# Patient Record
Sex: MALE | Race: WHITE | NOT HISPANIC OR LATINO
[De-identification: names, ages, dates, MRNs, and addresses within clinical notes are randomized per-mention and may not be internally consistent; named-entity substitution may affect disease eponyms.]

---

## 2018-12-05 PROBLEM — Z00.00 ENCOUNTER FOR PREVENTIVE HEALTH EXAMINATION: Status: ACTIVE | Noted: 2018-12-05

## 2018-12-13 ENCOUNTER — APPOINTMENT (OUTPATIENT)
Dept: OTOLARYNGOLOGY | Facility: CLINIC | Age: 58
End: 2018-12-13
Payer: COMMERCIAL

## 2018-12-13 VITALS
HEIGHT: 73 IN | WEIGHT: 237 LBS | BODY MASS INDEX: 31.41 KG/M2 | DIASTOLIC BLOOD PRESSURE: 78 MMHG | SYSTOLIC BLOOD PRESSURE: 118 MMHG

## 2018-12-13 DIAGNOSIS — H61.20 IMPACTED CERUMEN, UNSPECIFIED EAR: ICD-10-CM

## 2018-12-13 DIAGNOSIS — Z78.9 OTHER SPECIFIED HEALTH STATUS: ICD-10-CM

## 2018-12-13 PROCEDURE — 69210 REMOVE IMPACTED EAR WAX UNI: CPT

## 2018-12-13 PROCEDURE — 99202 OFFICE O/P NEW SF 15 MIN: CPT | Mod: 25

## 2018-12-20 ENCOUNTER — APPOINTMENT (OUTPATIENT)
Dept: OTOLARYNGOLOGY | Facility: CLINIC | Age: 58
End: 2018-12-20

## 2020-02-10 ENCOUNTER — APPOINTMENT (OUTPATIENT)
Dept: OTOLARYNGOLOGY | Facility: CLINIC | Age: 60
End: 2020-02-10
Payer: COMMERCIAL

## 2020-02-10 DIAGNOSIS — H93.19 TINNITUS, UNSPECIFIED EAR: ICD-10-CM

## 2020-02-10 DIAGNOSIS — H90.5 UNSPECIFIED SENSORINEURAL HEARING LOSS: ICD-10-CM

## 2020-02-10 PROCEDURE — 99214 OFFICE O/P EST MOD 30 MIN: CPT | Mod: 25

## 2020-02-10 PROCEDURE — 92550 TYMPANOMETRY & REFLEX THRESH: CPT

## 2020-02-10 PROCEDURE — 92557 COMPREHENSIVE HEARING TEST: CPT

## 2020-02-10 NOTE — HISTORY OF PRESENT ILLNESS
[Hearing Loss] : hearing loss [FreeTextEntry1] : 2/10/2020 patient is returning today for worsening tinnitus. he described the tinnitus as constant, and his hearing is also worsened. he did not get an audiogram back in 2018 as recommended. Patient notes right ear is worse than the left. He also notes his hearing is worse on the right than on the left.  [Ear Fullness] : no ear fullness [Tinnitus] : tinnitus [None] : No associated symptoms are reported. [Vertigo] : no vertigo

## 2020-05-07 ENCOUNTER — OUTPATIENT (OUTPATIENT)
Dept: OUTPATIENT SERVICES | Facility: HOSPITAL | Age: 60
LOS: 1 days | Discharge: HOME | End: 2020-05-07
Payer: COMMERCIAL

## 2020-05-07 DIAGNOSIS — R10.2 PELVIC AND PERINEAL PAIN: ICD-10-CM

## 2020-05-07 PROCEDURE — 72170 X-RAY EXAM OF PELVIS: CPT | Mod: 26

## 2021-03-07 ENCOUNTER — OUTPATIENT (OUTPATIENT)
Dept: OUTPATIENT SERVICES | Facility: HOSPITAL | Age: 61
LOS: 1 days | Discharge: HOME | End: 2021-03-07
Payer: COMMERCIAL

## 2021-03-07 DIAGNOSIS — R10.2 PELVIC AND PERINEAL PAIN: ICD-10-CM

## 2021-03-07 DIAGNOSIS — S30.0XXA CONTUSION OF LOWER BACK AND PELVIS, INITIAL ENCOUNTER: ICD-10-CM

## 2021-03-07 PROCEDURE — 72195 MRI PELVIS W/O DYE: CPT | Mod: 26

## 2023-05-01 ENCOUNTER — APPOINTMENT (OUTPATIENT)
Dept: CARDIOLOGY | Facility: CLINIC | Age: 63
End: 2023-05-01
Payer: COMMERCIAL

## 2023-05-01 VITALS
OXYGEN SATURATION: 96 % | DIASTOLIC BLOOD PRESSURE: 88 MMHG | HEART RATE: 73 BPM | HEIGHT: 73 IN | SYSTOLIC BLOOD PRESSURE: 122 MMHG | WEIGHT: 240 LBS | BODY MASS INDEX: 31.81 KG/M2

## 2023-05-01 DIAGNOSIS — I65.29 OCCLUSION AND STENOSIS OF UNSPECIFIED CAROTID ARTERY: ICD-10-CM

## 2023-05-01 PROCEDURE — 99204 OFFICE O/P NEW MOD 45 MIN: CPT | Mod: 25

## 2023-05-01 PROCEDURE — 93000 ELECTROCARDIOGRAM COMPLETE: CPT

## 2023-05-01 NOTE — ASSESSMENT
[FreeTextEntry1] : Assessment:\par #Carotid stenosis\par #DLD\par -3/2023 , , HDL 49,  - intermediate risk 8.8%\par #BMI 32\par #Former smoker\par #Prevention counseling\par - 3/28/23 A1c 5.2%\par \par Plan:\par - Echo to assess structure, function\par - Coronary calcium score\par - Carotid US given history of atherosclerosis on prior imaging\par - Counseled patient on diet and exercise\par - Return to clinic after testing\par

## 2023-05-01 NOTE — HISTORY OF PRESENT ILLNESS
[FreeTextEntry1] : 62M with no significant PMH here to establish care. \par \par Referring: Dr. Mars\par \par 23\par Stressful past few years, son in accident and hospitalized for 3 months\par \par Hxo hypertension, changed diet\par \par Weight gain\par Carotid stenosis on prior US - 6 or 7 years ago \par \par  for [employer]\par \par Sanitation - oversees and teaches mechanics\par \par Quit smoking 30 years ago\par \par FMH\par Father: CVA at 73 ,  11 months later\par Mother: DM, CVA\par Brother: pancreatic Ca, DM\par 5 living siblings\par Older sister: mild issue with heart \par Brother: MS\par

## 2023-05-01 NOTE — REVIEW OF SYSTEMS
[Feeling Fatigued] : not feeling fatigued [Cough] : no cough [Abdominal Pain] : no abdominal pain [Joint Pain] : no joint pain [Dizziness] : no dizziness [Negative] : Cardiovascular

## 2023-06-14 ENCOUNTER — APPOINTMENT (OUTPATIENT)
Dept: CARDIOLOGY | Facility: CLINIC | Age: 63
End: 2023-06-14
Payer: COMMERCIAL

## 2023-06-14 PROCEDURE — 93880 EXTRACRANIAL BILAT STUDY: CPT

## 2023-06-22 ENCOUNTER — APPOINTMENT (OUTPATIENT)
Dept: CARDIOLOGY | Facility: CLINIC | Age: 63
End: 2023-06-22
Payer: COMMERCIAL

## 2023-06-22 VITALS
HEIGHT: 73 IN | BODY MASS INDEX: 31.54 KG/M2 | WEIGHT: 238 LBS | SYSTOLIC BLOOD PRESSURE: 110 MMHG | DIASTOLIC BLOOD PRESSURE: 78 MMHG | OXYGEN SATURATION: 95 % | HEART RATE: 67 BPM

## 2023-06-22 DIAGNOSIS — Z71.82 EXERCISE COUNSELING: ICD-10-CM

## 2023-06-22 DIAGNOSIS — Z71.89 OTHER SPECIFIED COUNSELING: ICD-10-CM

## 2023-06-22 DIAGNOSIS — E78.5 HYPERLIPIDEMIA, UNSPECIFIED: ICD-10-CM

## 2023-06-22 DIAGNOSIS — Z71.3 DIETARY COUNSELING AND SURVEILLANCE: ICD-10-CM

## 2023-06-22 PROCEDURE — 99214 OFFICE O/P EST MOD 30 MIN: CPT

## 2023-06-22 NOTE — REVIEW OF SYSTEMS
[Negative] : Cardiovascular [Feeling Fatigued] : not feeling fatigued [Cough] : no cough [Abdominal Pain] : no abdominal pain [Joint Pain] : no joint pain [Dizziness] : no dizziness

## 2023-06-22 NOTE — HISTORY OF PRESENT ILLNESS
[FreeTextEntry1] : 62M with dyslipidemia here for follow-up. \par \par Referring: Dr. Mars\par \par 23  Pt is seen for f.u.  Denies chest pain, no SOB.  TTE wasn’t done due to not being approved by insurance.  Pt didn’t undergo Calcium score yet.   Pt is active without cardiac issues \par 23 R ICA<50% stenosis, L ICA <50% stenosis . \par 23 Chol 211 Trig 162  \par Pt is not on low chol diet.  \par \par 23\par Stressful past few years, son in accident and hospitalized for 3 months\par \par Hxo hypertension, changed diet\par \par Weight gain\par Carotid stenosis on prior US - 6 or 7 years ago \par \par  for [employer]\par \par Sanitation - oversees and teaches mechanics\par \par Quit smoking 30 years ago\par \par FMH\par Father: CVA at 73 ,  11 months later\par Mother: DM, CVA\par Brother: pancreatic Ca, DM\par 5 living siblings\par Older sister: mild issue with heart \par Brother: MS\par

## 2023-06-22 NOTE — ASSESSMENT
[FreeTextEntry1] : Assessment:\par #Mild carotid stenosis\par #DLD\par -3/2023 , , HDL 49,  - intermediate risk 8.8%\par #BMI 32\par #Former smoker\par #Prevention counseling\par - 3/28/23 A1c 5.2%\par \par Plan:\par - Discussed statin initiation. Patient wishes to undergo coronary calcium score first and re-evaluate\par - TTM after calcium score\par - Counseled patient on diet and exercise\par - Labs before next visit\par - Return to clinic 6 months\par

## 2023-06-22 NOTE — CARDIOLOGY SUMMARY
[de-identified] : EKG 5/1/23 Normal sinus rhythm 73 bpm non specific t wave flattening [de-identified] : Carotid US 6/14/23 R and L ICA <50% stenosis

## 2023-09-06 ENCOUNTER — APPOINTMENT (OUTPATIENT)
Dept: ORTHOPEDIC SURGERY | Facility: CLINIC | Age: 63
End: 2023-09-06
Payer: COMMERCIAL

## 2023-09-06 DIAGNOSIS — M70.22 OLECRANON BURSITIS, LEFT ELBOW: ICD-10-CM

## 2023-09-06 PROCEDURE — 99203 OFFICE O/P NEW LOW 30 MIN: CPT | Mod: 25

## 2023-09-06 PROCEDURE — 20600 DRAIN/INJ JOINT/BURSA W/O US: CPT

## 2023-09-06 PROCEDURE — 73070 X-RAY EXAM OF ELBOW: CPT | Mod: LT

## 2023-09-07 PROBLEM — M70.22 OLECRANON BURSITIS OF LEFT ELBOW: Status: ACTIVE | Noted: 2023-09-07

## 2023-09-07 NOTE — PROCEDURE
[de-identified] : Lengthy discussion was had with patient regarding exam history and imaging and treatment options and at this time patient elects to undergo aspiration of the olecranon bursa.  Under sterile conditions the olecranon bursa was aspirated with removing 6 cc of serosanguineous fluid a's Band-Aid and sterile dressing was applied with an Ace wrap to wear for the next 24 hours

## 2023-09-07 NOTE — HISTORY OF PRESENT ILLNESS
[de-identified] : Patient is a new patient presenting with several weeks of atraumatic discomfort and swelling on the olecranon tip he states only discomfort with direct pressure denies any fevers or any erythema

## 2023-09-07 NOTE — PHYSICAL EXAM
[de-identified] : Left elbow Constitutional:  The patient is healthy-appearing and in no apparent distress.   Cardiovascular System:  There is capillary refill less than 2 seconds.   Skin:  There is no skin abnormalities of elbow other than mild thickening and swelling overlying the olecranon bursa consistent with olecranon bursitis  Left Elbow:  Appearance:  There is no deformity, induration, redness, or warmth and a normal carrying angle.   Bony Palpation:  There is no tenderness of the medial epicondyle. There is no tenderness of olecranon. There is no tenderness of the ulnatrochlea articulation. There is no tenderness of the coronoid process. There is no tenderness of the radial head. There is no tenderness of the radiocapitellar joint. There is no tenderness of the lateral epicondyle.   Soft Tissue Palpation:  There is no tenderness of the ulnar nerve. There is no tenderness of the biceps insertion. There is no tenderness of the pronator teres. There is no tenderness of the flexor carpi ulnaris. There is no tenderness of the flexor carpi radialis. There is no tenderness of the annular ligament of the radius. There is no tenderness of the brachioradialis. There is no tenderness of the radial collateral ligament. There is no tenderness of the ulnar collateral ligament. There is no tenderness of the antecubital fossa. There is no tenderness of the extensor carpi radialis brevis. There is no tenderness of the extensor carpi radialis longus.  Range of Motion:   There is full range of motion both actively and passively.   Stability: There is no dislocation or laxity to testing.   Strength:  There is 5/5 elbow flexion, extension, supination and pronation.    Neurologic: There is normal sensation C5-T1 to light touch.   Psychiatric:  The patient demonstrates a normal mood and affect and is active and alert. [de-identified] : X-ray left elbow: There is no significant bony / soft tissue abnormality, arthritis, or fracture except mild soft tissue shadow over the olecranon bursa

## 2023-09-07 NOTE — ASSESSMENT
[FreeTextEntry1] : Discussed at length with patient exam history and imaging and he elects aspiration today he was discussed that there is a risk for infection with olecranon bursa and if any increased pain or any onset of redness he is to call the office discussed as well likelihood for recurrence and that if multiple aspirations fail to alleviate this ultimately consideration would be for bursal excision.  Patient is aware that the swelling can recur within a day weeks months and anywhere between and he will follow-up on an as-needed basis

## 2023-12-14 ENCOUNTER — APPOINTMENT (OUTPATIENT)
Dept: CARDIOLOGY | Facility: CLINIC | Age: 63
End: 2023-12-14

## 2024-03-10 PROBLEM — Z71.82 EXERCISE COUNSELING: Status: ACTIVE | Noted: 2023-05-01

## 2024-10-19 ENCOUNTER — OUTPATIENT (OUTPATIENT)
Dept: OUTPATIENT SERVICES | Facility: HOSPITAL | Age: 64
LOS: 1 days | End: 2024-10-19
Payer: MEDICARE

## 2024-10-19 DIAGNOSIS — R07.9 CHEST PAIN, UNSPECIFIED: ICD-10-CM

## 2024-10-19 PROCEDURE — 71046 X-RAY EXAM CHEST 2 VIEWS: CPT | Mod: 26

## 2024-10-19 PROCEDURE — 71046 X-RAY EXAM CHEST 2 VIEWS: CPT

## 2024-10-20 DIAGNOSIS — R07.9 CHEST PAIN, UNSPECIFIED: ICD-10-CM
